# Patient Record
Sex: MALE | Race: BLACK OR AFRICAN AMERICAN | NOT HISPANIC OR LATINO | Employment: UNEMPLOYED | URBAN - NONMETROPOLITAN AREA
[De-identification: names, ages, dates, MRNs, and addresses within clinical notes are randomized per-mention and may not be internally consistent; named-entity substitution may affect disease eponyms.]

---

## 2018-11-05 ENCOUNTER — TRANSCRIBE ORDERS (OUTPATIENT)
Dept: PHYSICAL THERAPY | Facility: HOSPITAL | Age: 33
End: 2018-11-05

## 2018-11-05 DIAGNOSIS — M54.12 RADICULOPATHY, CERVICAL REGION: Primary | ICD-10-CM

## 2019-02-20 ENCOUNTER — TRANSCRIBE ORDERS (OUTPATIENT)
Dept: PHYSICAL THERAPY | Facility: HOSPITAL | Age: 34
End: 2019-02-20

## 2019-02-20 DIAGNOSIS — M54.12 RADICULOPATHY, CERVICAL REGION: Primary | ICD-10-CM

## 2019-03-06 ENCOUNTER — HOSPITAL ENCOUNTER (OUTPATIENT)
Dept: PHYSICAL THERAPY | Facility: HOSPITAL | Age: 34
Setting detail: THERAPIES SERIES
Discharge: HOME | End: 2019-03-06

## 2019-03-06 DIAGNOSIS — M54.12 RADICULOPATHY, CERVICAL REGION: Primary | ICD-10-CM

## 2019-03-06 PROCEDURE — 97110 THERAPEUTIC EXERCISES: CPT | Performed by: PHYSICAL THERAPIST

## 2019-03-06 PROCEDURE — 97161 PT EVAL LOW COMPLEX 20 MIN: CPT | Performed by: PHYSICAL THERAPIST

## 2019-03-06 NOTE — THERAPY EVALUATION
"    Outpatient Physical Therapy Ortho Initial Evaluation  [facility]  Emily Aguila, PT, DPT, CSCS       Patient Name: Mckinley Woods  : 1985  MRN: [id number]  Today's Date: 3/6/2019      Visit Date: 2019    Pt reports 6/10 pain pre treatment, 4/10 pain post treatment  Reports N/A% of improvement.  Attended  visits.  Insurance available: 6 visits  Next MD appt: TBD .  Recertification: 2019.     Past Medical History:   Diagnosis Date   • Arthritis    • Asthma    • Neuropathy         Past Surgical History:   Procedure Laterality Date   • CARPAL TUNNEL RELEASE Bilateral 2017       Visit Dx:     ICD-10-CM ICD-9-CM   1. Radiculopathy, cervical region M54.12 723.4     Number of days off work: N/A    Patient is single.    Medications: Operazole 20mg, Tizanidine 4mg, Hydrochlorothiazide 12.5    Allergies: None    Patient History     Row Name 19 1100             History    Chief Complaint  Pain  -AJ      Type of Pain  Neck pain;Shoulder pain  -AJ      Date Current Problem(s) Began  -- ~2 months ago  -AJ      Brief Description of Current Complaint  patient reports the pain in mainly in his B shoulders. He reprots that his neck really doesn't hurt. he rpeorts he just cut all of his hair off to see if that helps. He no injury that he can recall.  -AJ      Previous treatment for THIS PROBLEM  Medication  -AJ      Patient/Caregiver Goals  Relieve pain;Return to prior level of function  -AJ      Current Tobacco Use  ~2 cig/day  -AJ      Smoking Status  daily smoker  -AJ      Patient's Rating of General Health  Good  -AJ      Hand Dominance  right-handed  -AJ      Occupation/sports/leisure activities  Occupation: lawn/landscaping; Hobbies: motor work, pools  -AJ      What clinical tests have you had for this problem?  X-ray  -AJ      Results of Clinical Tests  \"The didn't really see anything\"  -AJ      History of Previous Related Injuries  None that can recall  -AJ  "        Pain     Pain Location  Neck;Shoulder  -AJ      Pain at Present  6  -AJ      Pain at Best  0  -AJ      Pain at Worst  9  -AJ      Pain Frequency  Intermittent  -AJ      Pain Description  Throbbing;Burning  -AJ      What Performance Factors Make the Current Problem(s) WORSE?  lifting  -AJ      What Performance Factors Make the Current Problem(s) BETTER?  rest  -AJ      Is your sleep disturbed?  Yes  -AJ      Is medication used to assist with sleep?  Yes  -AJ      What position do you sleep in?  Supine  -AJ      Difficulties at work?  Off for off season, but unable if will be able to return  -AJ      Difficulties with ADL's?  None  -AJ      Difficulties with recreational activities?  motor work  -AJ        User Key  (r) = Recorded By, (t) = Taken By, (c) = Cosigned By    Initials Name Provider Type    Emily Morillo, PT Physical Therapist          PT Ortho     Row Name 03/06/19 1100       Subjective Comments    Subjective Comments  Patient wishes ot get the pain to go away.  -AJ       Precautions and Contraindications    Precautions/Limitations  no known precautions/limitations  -AJ       Subjective Pain    Able to rate subjective pain?  yes  -AJ    Pre-Treatment Pain Level  6  -AJ    Post-Treatment Pain Level  4  -AJ       Posture/Observations    Alignment Options  Forward head;Cervical lordosis;Thoracic kyphosis;Rounded shoulders;Scapular elevation  -AJ    Forward Head  Mild;Moderate;Increased  -AJ    Cervical Lordosis  Mild;Increased  -AJ    Thoracic Kyphosis  Mild;Increased  -AJ    Rounded Shoulders  Bilateral:;Mild;Moderate;Increased  -AJ    Scapular Elevation  Bilateral:;Mild;Increased  -AJ    Posture/Observations Comments  No acute distress, poor overall postural awareness.  -AJ       Sensory Screen for Light Touch- Upper Quarter Clearing    C4 (posterior shoulder)  Bilateral:;Intact  -AJ    C5 (lateral upper arm)  Bilateral:;Intact  -AJ    C6 (tip of thumb)  Bilateral:;Diminished  -AJ    C7  (tip of 3rd finger)  Bilateral:;Intact  -AJ    C8 (tip of 5th finger)  Bilateral:;Intact  -AJ    T1 (medial lower arm)  Bilateral:;Intact  -AJ       Cervical/Thoracic Special Tests    Spurlings (Foraminal Compression)  Negative  -AJ    Cervical Compression (Forarminal Compression vs. Facet Pain)  Bilateral:;Negative  -AJ    Cervical Distraction (Foraminal Compression vs. Facet Pain)  Bilateral:;Negative  -AJ    Transverse Ligament (Instability)  Negative  -AJ    Vertebral Artery Test (VBI Sign)  Bilateral:;Negative  -AJ    Phalen's Test (Carpal Tunnel Syndrome)  Bilateral:;Positive  -AJ       Head/Neck/Trunk    Neck Extension AROM  45°  -AJ    Neck Flexion AROM  50°  -AJ    Neck Lt Lateral Flexion AROM  30°  -AJ    Neck Rt Lateral Flexion AROM  27°  -AJ    Neck Lt Rotation AROM  62°  -AJ    Neck Rt Rotation AROM  55°  -AJ       MMT (Manual Muscle Testing)    General MMT Comments  B UE 4/5, poor  effeort, no change in pain  -AJ        Strength Right    # Reps  2  -AJ    Right Rung  2  -AJ    Right  Test 1  30  -AJ    Right  Test 2  26  -AJ     Strength Average Right  28  -AJ        Strength Left    # Reps  2  -AJ    Left Rung  2  -AJ    Left  Test 1  62  -AJ    Left  Test 2  62  -AJ     Strength Average Left  62  -AJ       Sensation    Sensation WNL?  WFL  -AJ    Light Touch  Partial deficits in the RUE;Partial deficits in the LUE  -AJ    Additional Comments  Some mild deficits  -AJ       Upper Extremity Flexibility    SCM  Bilateral:;Mildly limited  -AJ    Upper Trapezius  Bilateral:;Mildly limited;Moderately limited  -AJ    Levator Scapula  Bilateral:;Mildly limited;Moderately limited  -AJ       Pathomechanics    Spine Pathomechanics  Hinges into extension in lower cervical  -AJ       Transfers    Comment (Transfers)  I with all transfers.  -AJ       Gait/Stairs Assessment/Training    Comment (Gait/Stairs)  FWB, non-antalgic gait, normal arm swing with gait.  -AJ       Hand   Strength     Strength Affected Side  Bilateral  -ESTELA      User Key  (r) = Recorded By, (t) = Taken By, (c) = Cosigned By    Initials Name Provider Type    Emily Morillo, SHAHIDA Physical Therapist            Therapy Education  Given: HEP, Symptoms/condition management, Pain management, Posture/body mechanics, Mobility training  Program: New  How Provided: Verbal, Demonstration, Written  Provided to: Patient  Level of Understanding: Verbalized, Demonstrated     PT OP Goals     Row Name 03/06/19 1100          PT Short Term Goals    STG Date to Achieve  03/27/19  -     STG 1  I with HEP and have additions/changes by next recertitfication.  -     STG 2  AROM Cervical extension >= 55°.  -     STG 3  AROm B cervical SB >= 35°.  -     STG 4  AROm B cervical ROT >= 65°.  -     STG 5  B UE 4+/5 or greater.  -        Long Term Goals    LTG Date to Achieve  04/05/19  -     LTG 1  AROm B cervical ROT >= 70°.  -     LTG 2  AROm B cervical SB >= 40°.  -     LTG 3  b UE 5/5.  -     LTG 4  Patient to be more aware of posture and posture correction technique.  -     LTG 5  R  >= 50#.  -     LTG 6  I with final HEP.  -     LTG 7  D/C with a final HEP and free 30 day fitness formula memembership.  -        Time Calculation    PT Goal Re-Cert Due Date  03/27/19  -       User Key  (r) = Recorded By, (t) = Taken By, (c) = Cosigned By    Initials Name Provider Type    Emily Morillo, SHAHIDA Physical Therapist         Barriers to Rehab: Include significant or possible arthritic/degenerative changes that have occurred within the spine, Possible monetary/secondary gain.    Safety Issues: None noted.      PT Assessment/Plan     Row Name 03/06/19 1100          PT Assessment    Functional Limitations  Limitation in home management;Limitations in community activities;Performance in leisure activities;Performance in self-care ADL;Performance in work activities  -ESTELA     Impairments  Impaired  flexibility;Impaired muscle endurance;Impaired muscle length;Impaired muscle power;Impaired postural alignment;Joint mobility;Locomotion;Muscle strength;Pain;Posture;Poor body mechanics;Range of motion;Sensation  -     Assessment Comments  Patient did well with all ther ex and given written copy of HEP exercises.  -AJ     Rehab Potential  Fair  -AJ     Patient/caregiver participated in establishment of treatment plan and goals  Yes  -AJ     Patient would benefit from skilled therapy intervention  Yes  -AJ        PT Plan    PT Frequency  2x/week  -AJ     Predicted Duration of Therapy Intervention (Therapy Eval)  3-4 weeks, 6-8 visits  -AJ     Planned CPT's?  PT EVAL MOD COMPLELITY: 24031;PT RE-EVAL: 52560;PT THER PROC EA 15 MIN: 88584;PT THER ACT EA 15 MIN: 05267;PT MANUAL THERAPY EA 15 MIN: 47842;PT ELECTRICAL STIM UNATTEND: ;PT THER SUPP EA 15 MIN  -AJ     Physical Therapy Interventions (Optional Details)  gross motor skills;home exercise program;joint mobilization;manual therapy techniques;modalities;patient/family education;postural re-education;ROM (Range of Motion);strengthening;stretching  -     PT Plan Comments  Add reverse swims. Progrress overall ROM, strength, posture, scap stab, neural extensibility.  -       User Key  (r) = Recorded By, (t) = Taken By, (c) = Cosigned By    Initials Name Provider Type    Emily Morillo, PT Physical Therapist       Other therapeutic activities and/or exercises will be prescribed depending on the patients progress or lack there of.      Exercises     Row Name 03/06/19 1100             Subjective Comments    Subjective Comments  Patient wishes ot get the pain to go away.  -AJ         Subjective Pain    Able to rate subjective pain?  yes  -AJ      Pre-Treatment Pain Level  6  -AJ      Post-Treatment Pain Level  4  -AJ         Exercise 1    Exercise Name 1  pro II UE F/R  -AJ      Time 1  10 minutes  -      Additional Comments  L 3.0  -AJ          Exercise 2    Exercise Name 2  B UT S  -AJ      Reps 2  2  -AJ      Time 2  30 seconds  -AJ         Exercise 3    Exercise Name 3  B LS S  -AJ      Reps 3  2  -AJ      Time 3  30 seconds  -AJ         Exercise 4    Exercise Name 4  B SCM S  -AJ      Reps 4  2  -AJ      Time 4  30 seconds  -AJ         Exercise 5    Exercise Name 5  Posterior shoulder rolls between S  -AJ      Reps 5  10 each  -AJ         Exercise 6    Exercise Name 6  Swims  -AJ      Reps 6  10  -AJ        User Key  (r) = Recorded By, (t) = Taken By, (c) = Cosigned By    Initials Name Provider Type    Emily Morillo, PT Physical Therapist                        Outcome Measure Options: Neck Disability Index (NDI)  Neck Disability Index  Section 1 - Pain Intensity: The pain is moderate at the moment.  Section 2 - Personal Care: I need some help but manage most of my personal care.  Section 3 - Lifting: Pain prevents me from lifting heavy weights, but I can manage light weights if they are conveniently positioned.  Section 4 - Work: I can hardly do any work at all.  Section 5 - Headaches: I have no headaches at all.  Section 6 - Concentration: I have a fair degree of difficulty concentrating.  Section 7 - Sleeping: My sleep is greatly disturbed for up to 3-5 hours.  Section 8 - Driving: I can't drive as long as I want because of moderate neck pain.  Section 9 - Reading: I can't read as much as I want because of severe neck pain.  Section 10 - Recreation: I have neck pain with most recreational activities.  Neck Disability Index Score: 28  Neck Disability Index Comments: 56%      Time Calculation:   Start Time: 1106  Stop Time: 1152  Time Calculation (min): 46 min  Total Timed Code Minutes- PT: 23 minute(s)     Therapy Charges for Today     Code Description Service Date Service Provider Modifiers Qty    82689007019 HC PT EVAL LOW COMPLEXITY 2 3/6/2019 Emily Aguila, PT GP 1    21201843537 HC PT THER PROC EA 15 MIN 3/6/2019 [place]  Emily Hassan, PT GP 2    [id number]  PT THER SUPP EA 15 MIN 3/6/2019 Emily Aguila, PT GP 1          PT G-Codes  Outcome Measure Options: Neck Disability Index (NDI)  Neck Disability Index Score: 28         Emily Aguila PT, DPT, CSCS  3/6/2019

## 2019-03-11 ENCOUNTER — APPOINTMENT (OUTPATIENT)
Dept: PHYSICAL THERAPY | Facility: HOSPITAL | Age: 34
End: 2019-03-11

## 2019-03-12 ENCOUNTER — TELEPHONE (OUTPATIENT)
Dept: PHYSICAL THERAPY | Facility: HOSPITAL | Age: 34
End: 2019-03-12

## 2019-03-12 ENCOUNTER — APPOINTMENT (OUTPATIENT)
Dept: PHYSICAL THERAPY | Facility: HOSPITAL | Age: 34
End: 2019-03-12

## 2019-03-12 NOTE — TELEPHONE ENCOUNTER
Patient states that he didn't have a ride this morning because his brother didn't get off work on time. Agreeable to coming in at 14:30 today to recapture.

## 2019-03-15 ENCOUNTER — HOSPITAL ENCOUNTER (OUTPATIENT)
Dept: PHYSICAL THERAPY | Facility: HOSPITAL | Age: 34
Setting detail: THERAPIES SERIES
Discharge: HOME | End: 2019-03-15

## 2019-03-15 DIAGNOSIS — M54.12 RADICULOPATHY, CERVICAL REGION: Primary | ICD-10-CM

## 2019-03-15 PROCEDURE — 97110 THERAPEUTIC EXERCISES: CPT

## 2019-03-15 PROCEDURE — G0283 ELEC STIM OTHER THAN WOUND: HCPCS

## 2019-03-15 NOTE — THERAPY TREATMENT NOTE
Outpatient Physical Therapy Ortho Treatment Note  [facility]     Patient Name: Mckinley Woods  : 1985  MRN: [id number]  Today's Date: 3/15/2019      Visit Date: 03/15/2019  Reports N/A% of improvement.  Attended 2/2 visits.  Insurance available: 6 visits  Next MD appt: TBD .  Recertification: 2019.      Visit Dx:    ICD-10-CM ICD-9-CM   1. Radiculopathy, cervical region M54.12 723.4       There is no problem list on file for this patient.       Past Medical History:   Diagnosis Date   • Arthritis    • Asthma    • Neuropathy         Past Surgical History:   Procedure Laterality Date   • CARPAL TUNNEL RELEASE Bilateral 2017       PT Ortho     Row Name 03/15/19 0800       Precautions and Contraindications    Precautions/Limitations  no known precautions/limitations  -      User Key  (r) = Recorded By, (t) = Taken By, (c) = Cosigned By    Initials Name Provider Type    Carlitos Carrasco PTA Physical Therapy Assistant                      PT Assessment/Plan     Row Name 03/15/19 0900          PT Assessment    Functional Limitations  Limitation in home management;Limitations in community activities;Performance in leisure activities;Performance in self-care ADL;Performance in work activities  -     Impairments  Impaired flexibility;Impaired muscle endurance;Impaired muscle length;Impaired muscle power;Impaired postural alignment;Joint mobility;Locomotion;Muscle strength;Pain;Posture;Poor body mechanics;Range of motion;Sensation  -     Assessment Comments  Good erin of today's visit. Some cont reactivity at R UT area. He could possibly benefit from manual to R UT area.   -     Rehab Potential  Fair  -     Patient/caregiver participated in establishment of treatment plan and goals  Yes  -     Patient would benefit from skilled therapy intervention  Yes  -        PT Plan    PT Frequency  2x/week  -     Predicted Duration of Therapy Intervention (Therapy Eval)  3-4  "weeks  -JW     PT Plan Comments  Add STM to R UT.   -JW       User Key  (r) = Recorded By, (t) = Taken By, (c) = Cosigned By    Initials Name Provider Type    Carlitos Carrasco PTA Physical Therapy Assistant          Modalities     Row Name 03/15/19 0800             Ice    Ice Applied  Yes  -JW      Location  R UT  -JW      Rx Minutes  15 mins  -JW      Ice S/P Rx  Yes  -JW         ELECTRICAL STIMULATION    Attended/Unattended  Unattended  -JW      Stimulation Type  IFC  -JW      Location/Electrode Placement/Other  R UT  -JW       PT E-Stim Unattended (Manual) Minutes  15  -JW        User Key  (r) = Recorded By, (t) = Taken By, (c) = Cosigned By    Initials Name Provider Type    Carlitos Carrasco PTA Physical Therapy Assistant          Exercises     Row Name 03/15/19 0800             Subjective Comments    Subjective Comments  Pt late for visit. He reports R UT area pain today. He reports compliance with HEP.   -JW         Subjective Pain    Able to rate subjective pain?  yes  -JW      Pre-Treatment Pain Level  5  -JW      Post-Treatment Pain Level  -- decreased  -JW         Exercise 1    Exercise Name 1  PRO2, UE's  -JW      Time 1  5'  -JW      Additional Comments  L3  -JW         Exercise 2    Exercise Name 2  R UT S  -JW      Sets 2  2  -JW      Time 2  30\"  -JW         Exercise 3    Exercise Name 3  R LS S  -JW      Sets 3  2  -JW      Time 3  30\"  -JW         Exercise 4    Exercise Name 4  CROM: lat flex, rot  -JW      Reps 4  10x ea  -JW         Exercise 5    Exercise Name 5  Post shld rolls  -JW      Reps 5  20  -JW         Exercise 6    Exercise Name 6  Supine cerv retractions  -JW      Sets 6  2  -JW      Reps 6  10  -JW         Exercise 7    Exercise Name 7  Seated thoracic ext  -JW      Sets 7  2  -JW      Reps 7  10  -JW      Time 7  3\"  -JW      Additional Comments  1/2 foam roller in chair  -JW         Exercise 8    Exercise Name 8  Rows  -JW      Reps 8  20  -JW      Time 8  3\"  -JW      " Additional Comments  green tb  -         Exercise 9    Exercise Name 9  Prone over PB B horz abd  -      Sets 9  2  -      Reps 9  10  -        User Key  (r) = Recorded By, (t) = Taken By, (c) = Cosigned By    Initials Name Provider Type    Carlitos Carrasco PTA Physical Therapy Assistant                         PT OP Goals     Row Name 03/15/19 0800          PT Short Term Goals    STG Date to Achieve  03/27/19  -     STG 1  I with HEP and have additions/changes by next recertitfication.  -     STG 2  AROM Cervical extension >= 55°.  -     STG 3  AROm B cervical SB >= 35°.  -     STG 4  AROm B cervical ROT >= 65°.  -     STG 5  B UE 4+/5 or greater.  -        Long Term Goals    LTG Date to Achieve  04/05/19  -     LTG 1  AROm B cervical ROT >= 70°.  -     LTG 2  AROm B cervical SB >= 40°.  -     LTG 3  b UE 5/5.  -     LTG 4  Patient to be more aware of posture and posture correction technique.  -     LTG 5  R  >= 50#.  -     LTG 6  I with final HEP.  -     LTG 7  D/C with a final HEP and free 30 day fitness [facility].  -       User Chung  (r) = Recorded By, (t) = Taken By, (c) = Cosigned By    Initials Name Provider Type    Carlitos Carrasco PTA Physical Therapy Assistant                         Time Calculation:   Start Time: 0815  Stop Time: 0900  Time Calculation (min): 45 min  Total Timed Code Minutes- PT: 45 minute(s)  Therapy Suggested Charges     Code   Minutes Charges    11931 (CPT®) Hc Pt Neuromusc Re Education Ea 15 Min      85314 (CPT®) Hc Pt Ther Proc Ea 15 Min      29718 (CPT®) Hc Gait Training Ea 15 Min      26641 (CPT®) Hc Pt Therapeutic Act Ea 15 Min      84641 (CPT®) Hc Pt Manual Therapy Ea 15 Min      87699 (CPT®) Hc Pt Ther Massage- Per 15 Min      54262 (CPT®) Hc Pt Iontophoresis Ea 15 Min      56236 (CPT®) Hc Pt Elec Stim Ea-Per 15 Min      38838 (CPT®) Hc Pt Ultrasound Ea 15 Min      27396 (CPT®) Hc Pt Self Care/Mgmt/Train Ea 15 Min       85071 (CPT®) Hc Pt Prosthetic (S) Train Initial Encounter, Each 15 Min      14571 (CPT®) Hc Orthotic(S) Mgmt/Train Initial Encounter, Each 15min      34590 (CPT®) Hc Pt Aquatic Therapy Ea 15 Min      77779 (CPT®) Hc Pt Orthotic(S)/Prosthetic(S) Encounter, Each 15 Min       (CPT®) Hc Pt Electrical Stim Unattended 15 1    Total  15 1        Therapy Charges for Today     Code Description Service Date Service Provider Modifiers Qty    70456815290 HC PT ELECTRICAL STIM UNATTENDED 3/15/2019 Carlitos Mix, PTA  1    84506457737 HC PT THER PROC EA 15 MIN 3/15/2019 Carlitos Mix, PTA GP 2                    Carlitos Mix, YOKO  3/15/2019

## 2019-03-21 ENCOUNTER — TELEPHONE (OUTPATIENT)
Dept: PHYSICAL THERAPY | Facility: HOSPITAL | Age: 34
End: 2019-03-21

## 2019-04-01 ENCOUNTER — APPOINTMENT (OUTPATIENT)
Dept: PHYSICAL THERAPY | Facility: HOSPITAL | Age: 34
End: 2019-04-01

## 2019-04-04 ENCOUNTER — APPOINTMENT (OUTPATIENT)
Dept: PHYSICAL THERAPY | Facility: HOSPITAL | Age: 34
End: 2019-04-04

## 2019-04-18 ENCOUNTER — DOCUMENTATION (OUTPATIENT)
Dept: PHYSICAL THERAPY | Facility: HOSPITAL | Age: 34
End: 2019-04-18

## 2019-04-18 DIAGNOSIS — M54.12 RADICULOPATHY, CERVICAL REGION: Primary | ICD-10-CM

## 2020-03-02 ENCOUNTER — OFFICE VISIT (OUTPATIENT)
Dept: CARDIOLOGY | Facility: CLINIC | Age: 35
End: 2020-03-02

## 2020-03-02 VITALS
BODY MASS INDEX: 27.5 KG/M2 | SYSTOLIC BLOOD PRESSURE: 110 MMHG | HEIGHT: 67 IN | DIASTOLIC BLOOD PRESSURE: 78 MMHG | WEIGHT: 175.2 LBS | OXYGEN SATURATION: 100 % | HEART RATE: 63 BPM

## 2020-03-02 DIAGNOSIS — R07.9 CHEST PAIN, UNSPECIFIED TYPE: Primary | ICD-10-CM

## 2020-03-02 DIAGNOSIS — Z99.89 AMBULATES WITH CANE: ICD-10-CM

## 2020-03-02 DIAGNOSIS — R07.89 CHEST PAIN, ATYPICAL: Primary | ICD-10-CM

## 2020-03-02 DIAGNOSIS — I10 ESSENTIAL HYPERTENSION: ICD-10-CM

## 2020-03-02 DIAGNOSIS — F17.200 SMOKER: ICD-10-CM

## 2020-03-02 DIAGNOSIS — R00.2 PALPITATIONS: ICD-10-CM

## 2020-03-02 DIAGNOSIS — R06.09 DYSPNEA ON EXERTION: ICD-10-CM

## 2020-03-02 PROCEDURE — 99204 OFFICE O/P NEW MOD 45 MIN: CPT | Performed by: INTERNAL MEDICINE

## 2020-03-02 PROCEDURE — 93000 ELECTROCARDIOGRAM COMPLETE: CPT | Performed by: INTERNAL MEDICINE

## 2020-03-02 RX ORDER — AMLODIPINE BESYLATE 5 MG/1
TABLET ORAL DAILY
COMMUNITY

## 2020-03-02 RX ORDER — TRAMADOL HYDROCHLORIDE 50 MG/1
TABLET ORAL EVERY 6 HOURS
COMMUNITY
End: 2021-04-22

## 2020-03-02 RX ORDER — FLUTICASONE PROPIONATE 50 MCG
SPRAY, SUSPENSION (ML) NASAL DAILY
COMMUNITY

## 2020-03-02 NOTE — PATIENT INSTRUCTIONS
Dr. Chin has recommended a pharmacologic (dobutamine) stress test with echocardiography.    What is a Dobutamine Stress Echo Test?     Why do I need a stress test?     This test helps your physician determine how your heart functions when it is made to work harder by injecting dobutamine. Dobutamine is a drug that has an effect on the heart similar to exercise. This test is done on patients that are unable to exercise adequately or have severe lung disease. An echocardiogram, the ultrasound study of the heart, evaluates the heart’s size, how strongly it pumps blood, and how well the valves are working.     The dobutamine stress echo test is useful to determine:   • If there is a decreased supply of blood and oxygen to the heart at rest as well as with exercise   • If there is reduced movement of the heart muscle.   • Overall level of cardiovascular conditioning   • How quickly the heart recovers after exercise   • How hard the heart can work before symptoms develop   • Estimate the risk of surgery that can cause cardiac complications     What happens during the test?   • An echo technician will do a resting scan of your heart while you are lying down on an exam table. You will lie on your back and on your left side  . • A stress  will prep ten small areas on your chest and place electrodes (small, flat, sticky patches) on these areas. The electrodes are attached to an EKG monitor that charts your heart’s electrical activity during the test.   • You will be lying down on an exam table while the technician performs a resting EKG and blood pressure.   • A nurse will place an IV into a vein in your arm or hand. Next, your heart will be “stressed” by injecting a medication called dobutamine into the IV. This medication will increase your heart rate.   • Please tell the technician immediately if you have chest pain, shortness of breath, or any other unusual symptoms at any time.   • The stress lab staff  will watch for any changes on the EKG monitor that suggest the test should be stopped. The testing area is supervised by a physician.   • The echo technician will take images of your heart every three minutes during the test. • At the peak effects of dobutamine, a second echocardiogram will be taken to visualize the heart’s motion with exercise.   • Your heart rate, blood pressure, and EKG will continue to be monitored until the levels are returning to normal. One more echo scan will be done as your heart rate returns to normal    The risks     This test is very safe and there are usually no problems. There is a small risk of an abnormal heartbeat, chest pain or nausea and, if this happens, the appropriate action will be taken. On very rare occasions (approximately 1 in 2000) the test is associated with life-threatening events. If you have any questions about the risks associated with the procedure, your medical team will be able to discuss them with you at the appointment.      Instruction checklist for the dobutamine stress echo test:     . • Bring a list of medications you take with you to the test. Include the name and dosage amounts of each medicine. This information can be found on the prescription or bottle label.     You may take any of your regular morning medications unless otherwise instructed.       § If you have asthma, please bring your inhaler medication with you.     § If you have diabetes, ask your physician how to adjust your medications the day of your test.     • Please refrain from any strenuous exercise or activities the day before your test, or the day of the test     • Do Not eat or drink anything except for small amounts of water for 4 hours prior to your testing time.      • Do Not use lotion or powders on your chest the day of the test.     • Wear loose fitting, comfortable clothing. Pants or shorts and short sleeve shirts are preferred. (No long sleeves.) Do not wear one-piece  undergarments or body suits.      • The Dobutamine Stress Echo takes about one hour to complete including check-in time and actual test time.      • If you need to CANCEL OR CHANGE your appointment, please call [phone].     • If you have any QUESTIONS about the test, Please call [phone] and ask to speak to Dr. Giuseppe Baker's Medical Assistant. Please leave a message if prompted to do so. We will return your call as soon as possible.     • We request a 24 hour notice for changes or cancellations.    You MUST complete the DSE within 30 calendar days of being ordered by Dr. Chin.     You MUST arrive for your DSE appointment 10 minutes BEFORE the scheduled time, or your test will be rescheduled.    Results:    Dr. Chin will be physically present during your dobutamine stress echo.  You will be given the results of your test in real time.    Steps to Quit Smoking    Smoking tobacco can be harmful to your health and can affect almost every organ in your body. Smoking puts you, and those around you, at risk for developing many serious chronic diseases. Quitting smoking is difficult, but it is one of the best things that you can do for your health. It is never too late to quit.  What are the benefits of quitting smoking?  When you quit smoking, you lower your risk of developing serious diseases and conditions, such as:  · Lung cancer or lung disease, such as COPD.  · Heart disease.  · Stroke.  · Heart attack.  · Infertility.  · Osteoporosis and bone fractures.  Additionally, symptoms such as coughing, wheezing, and shortness of breath may get better when you quit. You may also find that you get sick less often because your body is stronger at fighting off colds and infections. If you are pregnant, quitting smoking can help to reduce your chances of having a baby of low birth weight.  How do I get ready to quit?  When you decide to quit smoking, create a plan to make sure that you are successful.  Before you quit:  · Pick a date to quit. Set a date within the next two weeks to give you time to prepare.  · Write down the reasons why you are quitting. Keep this list in places where you will see it often, such as on your bathroom mirror or in your car or wallet.  · Identify the people, places, things, and activities that make you want to smoke (triggers) and avoid them. Make sure to take these actions:  ? Throw away all cigarettes at home, at work, and in your car.  ? Throw away smoking accessories, such as ashtrays and lighters.  ? Clean your car and make sure to empty the ashtray.  ? Clean your home, including curtains and carpets.  · Tell your family, friends, and coworkers that you are quitting. Support from your loved ones can make quitting easier.  · Talk with your health care provider about your options for quitting smoking.  · Find out what treatment options are covered by your health insurance.  What strategies can I use to quit smoking?  Talk with your healthcare provider about different strategies to quit smoking. Some strategies include:  · Quitting smoking altogether instead of gradually lessening how much you smoke over a period of time. Research shows that quitting “cold turkey” is more successful than gradually quitting.  · Attending in-person counseling to help you build problem-solving skills. You are more likely to have success in quitting if you attend several counseling sessions. Even short sessions of 10 minutes can be effective.  · Finding resources and support systems that can help you to quit smoking and remain smoke-free after you quit. These resources are most helpful when you use them often. They can include:  ? Online chats with a counselor.  ? Telephone quitlines.  ? Printed self-help materials.  ? Support groups or group counseling.  ? Text messaging programs.  ? Mobile phone applications.  · Taking medicines to help you quit smoking. (If you are pregnant or breastfeeding, talk  with your health care provider first.) Some medicines contain nicotine and some do not. Both types of medicines help with cravings, but the medicines that include nicotine help to relieve withdrawal symptoms. Your health care provider may recommend:  ? Nicotine patches, gum, or lozenges.  ? Nicotine inhalers or sprays.  ? Non-nicotine medicine that is taken by mouth.  Talk with your health care provider about combining strategies, such as taking medicines while you are also receiving in-person counseling. Using these two strategies together makes you more likely to succeed in quitting than if you used either strategy on its own.  If you are pregnant or breastfeeding, talk with your health care provider about finding counseling or other support strategies to quit smoking. Do not take medicine to help you quit smoking unless told to do so by your health care provider.  What things can I do to make it easier to quit?  Quitting smoking might feel overwhelming at first, but there is a lot that you can do to make it easier. Take these important actions:  · Reach out to your family and friends and ask that they support and encourage you during this time. Call telephone quitlines, reach out to support groups, or work with a counselor for support.  · Ask people who smoke to avoid smoking around you.  · Avoid places that trigger you to smoke, such as bars, parties, or smoke-break areas at work.  · Spend time around people who do not smoke.  · Lessen stress in your life, because stress can be a smoking trigger for some people. To lessen stress, try:  ? Exercising regularly.  ? Deep-breathing exercises.  ? Yoga.  ? Meditating.  ? Performing a body scan. This involves closing your eyes, scanning your body from head to toe, and noticing which parts of your body are particularly tense. Purposefully relax the muscles in those areas.  · Download or purchase mobile phone or tablet apps (applications) that can help you stick to your  quit plan by providing reminders, tips, and encouragement. There are many free apps, such as QuitGuide from the CDC (Centers for Disease Control and Prevention). You can find other support for quitting smoking (smoking cessation) through smokefree.gov and other websites.  How will I feel when I quit smoking?  Within the first 24 hours of quitting smoking, you may start to feel some withdrawal symptoms. These symptoms are usually most noticeable 2-3 days after quitting, but they usually do not last beyond 2-3 weeks. Changes or symptoms that you might experience include:  · Mood swings.  · Restlessness, anxiety, or irritation.  · Difficulty concentrating.  · Dizziness.  · Strong cravings for sugary foods in addition to nicotine.  · Mild weight gain.  · Constipation.  · Nausea.  · Coughing or a sore throat.  · Changes in how your medicines work in your body.  · A depressed mood.  · Difficulty sleeping (insomnia).  After the first 2-3 weeks of quitting, you may start to notice more positive results, such as:  · Improved sense of smell and taste.  · Decreased coughing and sore throat.  · Slower heart rate.  · Lower blood pressure.  · Clearer skin.  · The ability to breathe more easily.  · Fewer sick days.  Quitting smoking is very challenging for most people. Do not get discouraged if you are not successful the first time. Some people need to make many attempts to quit before they achieve long-term success. Do your best to stick to your quit plan, and talk with your health care provider if you have any questions or concerns.  This information is not intended to replace advice given to you by your health care provider. Make sure you discuss any questions you have with your health care provider.  Document Released: 12/12/2002 Document Revised: 07/24/2018 Document Reviewed: 05/03/2016  [employer] Interactive Patient Education © 2020 [employer]
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
## 2020-03-17 DIAGNOSIS — R00.2 PALPITATIONS: Primary | ICD-10-CM

## 2020-05-15 ENCOUNTER — TELEPHONE (OUTPATIENT)
Dept: CARDIOLOGY | Facility: CLINIC | Age: 35
End: 2020-05-15

## 2020-05-15 NOTE — TELEPHONE ENCOUNTER
Patient was scheduled for a telephone visit today for test results. Testing has not been completed. The patient was contacted to reschedule his testing and appt. Stress echo was scheduled for may 19th at noon. Instructions were also provided. appt staff will contact him with the dates and times of other testing as well as his f/u with dr. Chin.

## 2020-05-19 ENCOUNTER — HOSPITAL ENCOUNTER (OUTPATIENT)
Dept: CARDIOLOGY | Facility: HOSPITAL | Age: 35
Discharge: HOME | End: 2020-05-19
Admitting: INTERNAL MEDICINE

## 2020-05-19 VITALS — BODY MASS INDEX: 26.78 KG/M2 | WEIGHT: 171 LBS

## 2020-05-19 PROCEDURE — 25010000002 ATROPINE PER 0.01 MG: Performed by: NURSE PRACTITIONER

## 2020-05-19 PROCEDURE — 25010000002 DOBUTAMINE: Performed by: INTERNAL MEDICINE

## 2020-05-19 PROCEDURE — 93350 STRESS TTE ONLY: CPT

## 2020-05-19 PROCEDURE — 93325 DOPPLER ECHO COLOR FLOW MAPG: CPT

## 2020-05-19 PROCEDURE — 93017 CV STRESS TEST TRACING ONLY: CPT

## 2020-05-19 PROCEDURE — 93320 DOPPLER ECHO COMPLETE: CPT

## 2020-05-19 PROCEDURE — 93351 STRESS TTE COMPLETE: CPT | Performed by: INTERNAL MEDICINE

## 2020-05-19 RX ORDER — ATROPINE SULFATE 1 MG/ML
0.7 INJECTION, SOLUTION INTRAMUSCULAR; INTRAVENOUS; SUBCUTANEOUS ONCE
Status: COMPLETED | OUTPATIENT
Start: 2020-05-19 | End: 2020-05-19

## 2020-05-19 RX ADMIN — DOBUTAMINE 10 MCG/KG/MIN: 12.5 INJECTION, SOLUTION, CONCENTRATE INTRAVENOUS at 12:29

## 2020-05-19 RX ADMIN — ATROPINE SULFATE 0.7 MG: 1 INJECTION, SOLUTION INTRAMUSCULAR; INTRAVENOUS; SUBCUTANEOUS at 12:28

## 2020-05-20 LAB
BH CV ECHO MEAS - BSA(HAYCOCK): 1.9 M^2
BH CV ECHO MEAS - BSA: 1.9 M^2
BH CV ECHO MEAS - BZI_BMI: 26.8 KILOGRAMS/M^2
BH CV ECHO MEAS - BZI_METRIC_HEIGHT: 170.2 CM
BH CV ECHO MEAS - BZI_METRIC_WEIGHT: 77.6 KG
BH CV STRESS BP STAGE 1: NORMAL
BH CV STRESS BP STAGE 2: NORMAL
BH CV STRESS BP STAGE 3: NORMAL
BH CV STRESS BP STAGE 4: NORMAL
BH CV STRESS DOSE DOBUTAMINE STAGE 1: 10
BH CV STRESS DOSE DOBUTAMINE STAGE 2: 20
BH CV STRESS DOSE DOBUTAMINE STAGE 3: 30
BH CV STRESS DOSE DOBUTAMINE STAGE 4: 40
BH CV STRESS DURATION MIN STAGE 1: 3
BH CV STRESS DURATION MIN STAGE 2: 3
BH CV STRESS DURATION MIN STAGE 3: 3
BH CV STRESS DURATION MIN STAGE 4: 3
BH CV STRESS DURATION SEC STAGE 1: 0
BH CV STRESS DURATION SEC STAGE 2: 0
BH CV STRESS DURATION SEC STAGE 3: 0
BH CV STRESS DURATION SEC STAGE 4: 1
BH CV STRESS ECHO POST STRESS EJECTION FRACTION EF: 70 %
BH CV STRESS HR STAGE 1: 55
BH CV STRESS HR STAGE 2: 81
BH CV STRESS HR STAGE 3: 99
BH CV STRESS HR STAGE 4: 160
BH CV STRESS PROTOCOL 1: NORMAL
BH CV STRESS RECOVERY BP: NORMAL MMHG
BH CV STRESS RECOVERY HR: 97 BPM
BH CV STRESS STAGE 1: 1
BH CV STRESS STAGE 2: 2
BH CV STRESS STAGE 3: 3
BH CV STRESS STAGE 4: 4
MAXIMAL PREDICTED HEART RATE: 184 BPM
PERCENT MAX PREDICTED HR: 89.13 %
STRESS BASELINE BP: NORMAL MMHG
STRESS BASELINE HR: 58 BPM
STRESS PERCENT HR: 105 %
STRESS POST ESTIMATED WORKLOAD: 1 METS
STRESS POST PEAK BP: NORMAL MMHG
STRESS POST PEAK HR: 164 BPM
STRESS TARGET HR: 156 BPM

## 2020-12-07 ENCOUNTER — OFFICE VISIT (OUTPATIENT)
Dept: CARDIOLOGY | Facility: CLINIC | Age: 35
End: 2020-12-07

## 2020-12-07 DIAGNOSIS — R06.09 DYSPNEA ON EXERTION: ICD-10-CM

## 2020-12-07 DIAGNOSIS — R00.2 PALPITATIONS: ICD-10-CM

## 2020-12-07 DIAGNOSIS — R07.89 CHEST PAIN, ATYPICAL: Primary | ICD-10-CM

## 2020-12-07 PROCEDURE — 99214 OFFICE O/P EST MOD 30 MIN: CPT | Performed by: INTERNAL MEDICINE

## 2020-12-07 NOTE — PROGRESS NOTES
Pulmonary Arterial Hypertension & Heart Failure   Iain Chin M.D., Ph.D., [facility]              Non-Face-To-Face Scheduled Telephone Service    Mckinley Woods is a 35 y.o. male who has requested telephone service in lieu of a follow-up appointment for results and advice.  This patient is an established patient.  There is no face-to-face service planned within the next 24 hours.  There also has been no E/M in the past 7 days.    You have chosen to receive care through a telephone visit. Do you consent to use a telephone visit for your medical care today? Yes     This phone visit is conducted due to COIVD. Limitations were explained, including inability to perform a physical examination and inability to perform vital signs. Patient was agreeable to proceeding with these limitations, as I personally explained them.       Mckinley Woods is a 35 y.o. male who is followed in my clinic typically.  He was seen for dyspnea, palpitations and CPS. He was a no show to testing. He was then contacted and was agreeable to complete a SE. TTE and holter has not been performed. He has had no further CPS. He continues with exertional dyspnea. He continues with increased cardiac awareness and palpitations.  Review of Systems   Cardiovascular: Positive for dyspnea on exertion, irregular heartbeat and palpitations. Negative for chest pain, claudication and cyanosis.   Respiratory: Positive for shortness of breath. Negative for cough, hemoptysis, sleep disturbances due to breathing, snoring, sputum production and wheezing.      family history includes Asthma in his mother; COPD in his mother; Heart disease in his mother; Hyperlipidemia in his mother; Hypertension in his mother; Rheum arthritis in his mother; Sleep apnea in his sister.   reports that he has been smoking cigarettes. He has smoked for the past 5.00 years. He has never used smokeless tobacco. He reports current alcohol use. He reports current drug  use. Drug: Marijuana.  Allergies   Allergen Reactions   • Other Swelling     Cherries / throat swells   • Flexeril [Cyclobenzaprine] Cough     Coughing up blood       Current Outpatient Medications:   •  amLODIPine (NORVASC) 5 MG tablet, Daily., Disp: , Rfl:   •  fluticasone (FLONASE) 50 MCG/ACT nasal spray, Daily., Disp: , Rfl:   •  omeprazole (priLOSEC) 40 MG capsule, Take 40 mg by mouth Daily., Disp: , Rfl:   •  oxyCODONE-Acetaminophen (PERCOCET PO), Take  by mouth., Disp: , Rfl:   •  traMADol (ULTRAM) 50 MG tablet, Every 6 (Six) Hours., Disp: , Rfl:       DATA REVIEWED:     TTE/COOKIE:  Results for orders placed in visit on 03/02/20   Adult Stress Echo W/ Cont or Stress Agent if Necessary Per Protocol    Narrative · Resting left ventricular systolic function is normal at 56-60%.   Concentric hypertrophy is present.  · Trace pericardial effusion is noted.  · Target heart rate achieved with dobutamine and atropine. Stress ECG   without evidence of ischemia.  · Stress LVEF is hyperdynamic at greater than 70%.  · Normal stress echo with no significant echocardiographic evidence for   myocardial ischemia.          --------------------------------------------------------------------------------------------------  LABS:       No results found for: GLUCOSE, BUN, CREATININE, EGFRIFNONA, EGFRIFAFRI, BCR, K, CO2, CALCIUM, PROTENTOTREF, ALBUMIN, LABIL2, BILIRUBIN, AST, ALT  No results found for: WBC, HGB, HCT, MCV, PLT  No results found for: CHOL, CHLPL, TRIG, HDL, LDL, LDLDIRECT  No results found for: TSH, O8HJTRK, Y1TKGTR, THYROIDAB  No results found for: CKTOTAL, CKMB, CKMBINDEX, TROPONINI, TROPONINT  No results found for: HGBA1C  No results found for: DDIMER  No results found for: ALT  No results found for: HGBA1C  No results found for: GLUF, MICROALBUR, CREATININE  No results found for: IRON, TIBC, FERRITIN  No results found for: INR, PROTIME    Assessment/Plan     1. Chest pain, atypical. Low-risk SE. Results reviewed.  No further complaints. Will mail low-risk stress test explanation.     2. Dyspnea on exertion and palpitations, TTE, 6MWT, ZIO and PFTs were recommended. He is asking for one day testing for all of these. Will get the  to arrange.     Return in about 3 months (around 3/7/2021).         Time: 21 minutes including medical review, telephone, creating a note and ordering tests/follow-ups.     Iain Chin MD PhD

## 2020-12-07 NOTE — PATIENT INSTRUCTIONS
"LOW-RISK STRESS TEST  RESULTS  Iain Chin M.D., Ph.D., [facility]      Mckinley Birdlisa Woods:    Because of the symptoms you are having you were sent for a stress test.  As we discussed today in your office appointment, you have received what is known as a \"low-risk\" stress test result.  Often times, people want to refer to this as a \"normal\" stress test.  However, stress test really are not normal.  They can only classify your risk of having coronary artery disease.  You have received the best case scenario.  This document is my attempt to explain those results to you further.    Stress testing is never 100% accurate, which is why you were not given any guarantees.  Stress testing does not always accurately diagnose coronary artery disease.  Sometimes stress tests can indicate coronary artery disease even when patients do not actually have coronary artery disease.  Therefore, the test results are only used to change the probability that you have coronary artery disease.  Stress testing can never absolutely rule it out or diagnose it.    One thing we can say for certain about your test results: Your test results makes your symptoms much less likely to be coronary artery disease.  Our stress tests are designed to detect arteries that are severely narrowed (70% or more).  Therefore, it is possible to have other types of buildup in your arteries that are less than 70% and still have a \"low-risk\" stress test result.    To put this in plain language, a low-risk stress test possibly means that you do not have any significant narrowing of the arteries to your heart by greater than 70%.  However, because they are not perfect you still could have a heart attack if you have smaller build up (less than 70%) that ruptures and forms a clot.  However, the chance of this is much lower with a normal stress test.    Here are some statistics about stress test results:    · 97% of patients who perform a treadmill stress test and " have a low-risk result will be alive in 5 years.      · If you are a patient who had a nuclear myocardial perfusion scintigraphy test with a low-risk result, your chance of actually dying from a cardiac death is 0.5 %/year.  Additionally, there is a 98% chance that you do not have significant narrowing of the arteries to your heart.    · If you received a stress echocardiogram and had a low risk result, your chance yearly of having a cardiovascular event is only 0.8%.  In fact, your chance of not having a heart attack in the next 4 years is 97%.  Overall, your test results can provide you up to a 99% chance that you actually do not have any severe narrowing of the arteries to your heart.      At this point, you can be reassured based on these numbers.  However, because a small number of patients may have severely narrowed arteries with a low-risk stress test, it is important that you monitor your symptoms.  If you have any changes in your symptoms, please do not hesitate to contact my office.  We can always arrange additional testing.    Best in health,    Iain Chin MD PhD

## 2021-03-30 ENCOUNTER — PROCEDURE VISIT (OUTPATIENT)
Dept: PULMONOLOGY | Facility: CLINIC | Age: 36
End: 2021-03-30

## 2021-03-30 ENCOUNTER — OFFICE VISIT (OUTPATIENT)
Dept: CARDIOLOGY | Facility: CLINIC | Age: 36
End: 2021-03-30

## 2021-03-30 VITALS
OXYGEN SATURATION: 94 % | DIASTOLIC BLOOD PRESSURE: 78 MMHG | HEART RATE: 82 BPM | SYSTOLIC BLOOD PRESSURE: 112 MMHG | BODY MASS INDEX: 27.53 KG/M2 | WEIGHT: 175.8 LBS

## 2021-03-30 DIAGNOSIS — R06.09 DYSPNEA ON EXERTION: ICD-10-CM

## 2021-03-30 LAB
BH CV ECHO MEAS - AO MAX PG (FULL): 1.2 MMHG
BH CV ECHO MEAS - AO MAX PG: 6.6 MMHG
BH CV ECHO MEAS - AO MEAN PG (FULL): 1 MMHG
BH CV ECHO MEAS - AO MEAN PG: 4 MMHG
BH CV ECHO MEAS - AO ROOT AREA (BSA CORRECTED): 1.6
BH CV ECHO MEAS - AO ROOT AREA: 7.5 CM^2
BH CV ECHO MEAS - AO ROOT DIAM: 3.1 CM
BH CV ECHO MEAS - AO V2 MAX: 128 CM/SEC
BH CV ECHO MEAS - AO V2 MEAN: 90.7 CM/SEC
BH CV ECHO MEAS - AO V2 VTI: 23.8 CM
BH CV ECHO MEAS - ASC AORTA: 2.9 CM
BH CV ECHO MEAS - AVA(I,A): 2.6 CM^2
BH CV ECHO MEAS - AVA(I,D): 2.6 CM^2
BH CV ECHO MEAS - AVA(V,A): 2.6 CM^2
BH CV ECHO MEAS - AVA(V,D): 2.6 CM^2
BH CV ECHO MEAS - BSA(HAYCOCK): 1.9 M^2
BH CV ECHO MEAS - BSA: 1.9 M^2
BH CV ECHO MEAS - BZI_BMI: 26.8 KILOGRAMS/M^2
BH CV ECHO MEAS - BZI_METRIC_HEIGHT: 170.2 CM
BH CV ECHO MEAS - BZI_METRIC_WEIGHT: 77.6 KG
BH CV ECHO MEAS - EDV(CUBED): 91.1 ML
BH CV ECHO MEAS - EDV(MOD-SP2): 66 ML
BH CV ECHO MEAS - EDV(MOD-SP4): 86 ML
BH CV ECHO MEAS - EDV(TEICH): 92.4 ML
BH CV ECHO MEAS - EF(CUBED): 64 %
BH CV ECHO MEAS - EF(MOD-SP2): 56.1 %
BH CV ECHO MEAS - EF(MOD-SP4): 68.6 %
BH CV ECHO MEAS - EF(TEICH): 55.7 %
BH CV ECHO MEAS - EPSS: 0.5 CM
BH CV ECHO MEAS - ESV(CUBED): 32.8 ML
BH CV ECHO MEAS - ESV(MOD-SP2): 29 ML
BH CV ECHO MEAS - ESV(MOD-SP4): 27 ML
BH CV ECHO MEAS - ESV(TEICH): 41 ML
BH CV ECHO MEAS - FS: 28.9 %
BH CV ECHO MEAS - IVS/LVPW: 1
BH CV ECHO MEAS - IVSD: 1.1 CM
BH CV ECHO MEAS - LA DIMENSION: 3 CM
BH CV ECHO MEAS - LA/AO: 0.97
BH CV ECHO MEAS - LV DIASTOLIC VOL/BSA (35-75): 45.5 ML/M^2
BH CV ECHO MEAS - LV MASS(C)D: 175 GRAMS
BH CV ECHO MEAS - LV MASS(C)DI: 92.5 GRAMS/M^2
BH CV ECHO MEAS - LV MAX PG: 5.4 MMHG
BH CV ECHO MEAS - LV MEAN PG: 3 MMHG
BH CV ECHO MEAS - LV SYSTOLIC VOL/BSA (12-30): 14.3 ML/M^2
BH CV ECHO MEAS - LV V1 MAX: 116 CM/SEC
BH CV ECHO MEAS - LV V1 MEAN: 73.8 CM/SEC
BH CV ECHO MEAS - LV V1 VTI: 22.1 CM
BH CV ECHO MEAS - LVIDD: 4.5 CM
BH CV ECHO MEAS - LVIDS: 3.2 CM
BH CV ECHO MEAS - LVLD AP2: 8.1 CM
BH CV ECHO MEAS - LVLD AP4: 8.2 CM
BH CV ECHO MEAS - LVLS AP2: 7.1 CM
BH CV ECHO MEAS - LVLS AP4: 7.1 CM
BH CV ECHO MEAS - LVOT AREA (M): 2.8 CM^2
BH CV ECHO MEAS - LVOT AREA: 2.8 CM^2
BH CV ECHO MEAS - LVOT DIAM: 1.9 CM
BH CV ECHO MEAS - LVPWD: 1.1 CM
BH CV ECHO MEAS - MR MAX PG: 81.4 MMHG
BH CV ECHO MEAS - MR MAX VEL: 451 CM/SEC
BH CV ECHO MEAS - MV A MAX VEL: 45.9 CM/SEC
BH CV ECHO MEAS - MV DEC SLOPE: 312 CM/SEC^2
BH CV ECHO MEAS - MV E MAX VEL: 73.5 CM/SEC
BH CV ECHO MEAS - MV E/A: 1.6
BH CV ECHO MEAS - MV MAX PG: 3.2 MMHG
BH CV ECHO MEAS - MV MEAN PG: 1 MMHG
BH CV ECHO MEAS - MV P1/2T MAX VEL: 83.4 CM/SEC
BH CV ECHO MEAS - MV P1/2T: 78.3 MSEC
BH CV ECHO MEAS - MV V2 MAX: 88.9 CM/SEC
BH CV ECHO MEAS - MV V2 MEAN: 56.9 CM/SEC
BH CV ECHO MEAS - MV V2 VTI: 24.8 CM
BH CV ECHO MEAS - MVA P1/2T LCG: 2.6 CM^2
BH CV ECHO MEAS - MVA(P1/2T): 2.8 CM^2
BH CV ECHO MEAS - MVA(VTI): 2.5 CM^2
BH CV ECHO MEAS - PA MAX PG (FULL): 2 MMHG
BH CV ECHO MEAS - PA MAX PG: 3.8 MMHG
BH CV ECHO MEAS - PA MEAN PG (FULL): 2 MMHG
BH CV ECHO MEAS - PA MEAN PG: 3 MMHG
BH CV ECHO MEAS - PA V2 MAX: 97.9 CM/SEC
BH CV ECHO MEAS - PA V2 MEAN: 74.1 CM/SEC
BH CV ECHO MEAS - PA V2 VTI: 22.4 CM
BH CV ECHO MEAS - PI END-D VEL: 95 CM/SEC
BH CV ECHO MEAS - RV MAX PG: 1.8 MMHG
BH CV ECHO MEAS - RV MEAN PG: 1 MMHG
BH CV ECHO MEAS - RV V1 MAX: 67.7 CM/SEC
BH CV ECHO MEAS - RV V1 MEAN: 45.1 CM/SEC
BH CV ECHO MEAS - RV V1 VTI: 15.4 CM
BH CV ECHO MEAS - RVDD: 2.5 CM
BH CV ECHO MEAS - SI(AO): 95 ML/M^2
BH CV ECHO MEAS - SI(CUBED): 30.8 ML/M^2
BH CV ECHO MEAS - SI(LVOT): 33.1 ML/M^2
BH CV ECHO MEAS - SI(MOD-SP2): 19.6 ML/M^2
BH CV ECHO MEAS - SI(MOD-SP4): 31.2 ML/M^2
BH CV ECHO MEAS - SI(TEICH): 27.2 ML/M^2
BH CV ECHO MEAS - SV(AO): 179.6 ML
BH CV ECHO MEAS - SV(CUBED): 58.4 ML
BH CV ECHO MEAS - SV(LVOT): 62.7 ML
BH CV ECHO MEAS - SV(MOD-SP2): 37 ML
BH CV ECHO MEAS - SV(MOD-SP4): 59 ML
BH CV ECHO MEAS - SV(TEICH): 51.5 ML
BH CV ECHO MEAS - TR MAX VEL: 185 CM/SEC
BH CV VAS BP LEFT ARM: NORMAL MMHG

## 2021-03-30 PROCEDURE — 94729 DIFFUSING CAPACITY: CPT | Performed by: INTERNAL MEDICINE

## 2021-03-30 PROCEDURE — 94010 BREATHING CAPACITY TEST: CPT | Performed by: INTERNAL MEDICINE

## 2021-03-30 PROCEDURE — 94618 PULMONARY STRESS TESTING: CPT | Performed by: INTERNAL MEDICINE

## 2021-03-30 PROCEDURE — 94727 GAS DIL/WSHOT DETER LNG VOL: CPT | Performed by: INTERNAL MEDICINE

## 2021-03-30 NOTE — PROGRESS NOTES
Full PFT performed. No post spirometry per order.     Good patient effort and cooperation.     Ordered by Dr. Chin, read by Dr. Tereso Chapman

## 2021-03-30 NOTE — PROGRESS NOTES
Mckinley Woods  Procedure: 6 Minute Walk Test   03/30/2021  Indication:CHF    Pretest: BP:118/74               HR:71               Sa02:98               Dyspnea:5               Fatigue:7    Post Test: BP:132/80               HR:51                  Sa02:99               Dyspnea:5               Fatigue:7    First 6MWT:yes    Supplemental oxygen during test:no    Stopped before 6 minutes:no    Pauses:0    Results in distance walked:356.24 meters    Did individual experience any pain or discomfort:no    I was present for the above test and agree with the data.     NYHA Functional Class I  Iain Chin MD PhD

## 2021-04-22 ENCOUNTER — OFFICE VISIT (OUTPATIENT)
Dept: CARDIOLOGY | Facility: CLINIC | Age: 36
End: 2021-04-22

## 2021-04-22 VITALS
SYSTOLIC BLOOD PRESSURE: 117 MMHG | BODY MASS INDEX: 28.25 KG/M2 | DIASTOLIC BLOOD PRESSURE: 70 MMHG | OXYGEN SATURATION: 99 % | HEART RATE: 74 BPM | WEIGHT: 180 LBS | HEIGHT: 67 IN

## 2021-04-22 DIAGNOSIS — I47.1 PAROXYSMAL SVT (SUPRAVENTRICULAR TACHYCARDIA) (HCC): ICD-10-CM

## 2021-04-22 DIAGNOSIS — R06.09 DYSPNEA ON EXERTION: ICD-10-CM

## 2021-04-22 DIAGNOSIS — R07.89 CHEST PAIN, ATYPICAL: Primary | ICD-10-CM

## 2021-04-22 DIAGNOSIS — R94.2 ABNORMAL PFT: ICD-10-CM

## 2021-04-22 PROCEDURE — 99214 OFFICE O/P EST MOD 30 MIN: CPT | Performed by: INTERNAL MEDICINE

## 2021-04-22 RX ORDER — KETOCONAZOLE 20 MG/ML
SHAMPOO TOPICAL
COMMUNITY

## 2021-04-22 NOTE — PROGRESS NOTES
Pulmonary Arterial Hypertension & Heart Failure      Iain Chin M.D., Ph.D., [facility]               History of Present Illness  This is a 35 y.o. male with:    1. CPS with low-rise  2. Palpitations without EP etiology  3. Asymptomatic SVT  4. Dyspnea  5. Risks: HTN, Dyslipidemia, Smoker, Overweight      Review of Systems - Review of Systems   Cardiovascular: Positive for dyspnea on exertion. Negative for chest pain, claudication, cyanosis, irregular heartbeat, leg swelling, near-syncope, orthopnea, palpitations, paroxysmal nocturnal dyspnea and syncope.   Respiratory: Positive for shortness of breath and snoring. Negative for cough and hemoptysis.    All other systems reviewed and are negative.       All other systems were reviewed and were negative.    family history includes Asthma in his mother; COPD in his mother; Heart disease in his mother; Hyperlipidemia in his mother; Hypertension in his mother; Rheum arthritis in his mother; Sleep apnea in his sister.     reports that he has been smoking cigarettes. He has smoked for the past 5.00 years. He has never used smokeless tobacco. He reports current alcohol use. He reports current drug use. Drug: Marijuana.    Allergies   Allergen Reactions   • Other Swelling     Cherries / throat swells   • Flexeril [Cyclobenzaprine] Cough     Coughing up blood         Current Outpatient Medications:   •  amLODIPine (NORVASC) 5 MG tablet, Daily., Disp: , Rfl:   •  fluticasone (FLONASE) 50 MCG/ACT nasal spray, Daily., Disp: , Rfl:   •  ketoconazole (NIZORAL) 2 % shampoo, ketoconazole 2 % shampoo  APPLY TO THE AFFECTED AREA(S), LATHER, LEAVE IN PLACE FOR 5 MINUTES, AND THEN RINSE OFF WITH WATER BY TOPICAL ROUTE ONCE DAILY, Disp: , Rfl:   •  omeprazole (priLOSEC) 40 MG capsule, Take 40 mg by mouth Daily., Disp: , Rfl:       Physical Exam:  Vitals:    04/22/21 1003   BP: 117/70   BP Location: Right arm   Patient Position: Sitting   Cuff Size: Adult   Pulse: 74   SpO2: 99%    "  Weight: 81.6 kg (180 lb)   Height: 170.2 cm (67\")   PainSc: 0-No pain     Pulse Pressure: normal  Pulse Ox: Normal  on room air  General: alert, appears stated age, cooperative and Ambulates with a cane     Body Habitus: overweight    HEENT: Head: Normocephalic, no lesions, without obvious abnormality. No arcus senilis, xanthelasma or xanthomas.    Neuro: alert, oriented x3  speech normal in context and clarity  memory intact grossly  Pulses: 2+ and symmetric  JVP: Volume/Pulsation: Normal.  Normal waveforms.   Appropriate inspiratory decrease.  No Kussmaul's. No Shane's.   Respirations: no increased work of breathing     Pulmonary:Normal     Precordium: Normal impulses. P2 is not palpable.  RV Heave: absent  LV Heave: absent  [place]:  normal size and placement  Palpable S4: absent.  Heart rate: normal    Heart Rhythm: regular     Heart Sounds: S1: normal intensity  S2: normal intensity  S3: absent   S4: absent  Opening Snap: absent    A2-OS:  no  Pericardial Rub:  Absent   Ejection click: None      Murmurs:  absent   Extremity: moves all extremities equally.     DATA REVIEWED:   Results for orders placed in visit on 03/02/20    Adult Stress Echo W/ Cont or Stress Agent if Necessary Per Protocol    Interpretation Summary  · Resting left ventricular systolic function is normal at 56-60%. Concentric hypertrophy is present.  · Trace pericardial effusion is noted.  · Target heart rate achieved with dobutamine and atropine. Stress ECG without evidence of ischemia.  · Stress LVEF is hyperdynamic at greater than 70%.  · Normal stress echo with no significant echocardiographic evidence for myocardial ischemia.    Stress echo shows no evidence of inducible ischemia.    I personally interpreted the 2D TTE.  He has preserved biventricular function.    I interpreted a 6-minute walk test.  Stable.    PFTs were mildly " restrictive.  --------------------------------------------------------------------------------------------------    Assessment/Plan        1. Chest pain, atypical    2. Dyspnea on exertion    3. Paroxysmal SVT (supraventricular tachycardia) (CMS/HCC)    4. Abnormal PFT      1.  Stress echo was low-risk.  Low-risk stress testing handout was provided.  Negative predictive value was explained to the patient in a manner he can understand.  At this point, he requires no further cardiovascular follow-up.    2.  I suspect this is multifactorial.   PFTs were mildly restrictive.  This is likely related to his body habitus, but ILD would need to be excluded.  I recommended a pulmonary referral.    3.  This was asymptomatic on a monitor.  Palpitations did not correlate with arrhythmia.  He does not meet criteria for suppressive therapy.  His burden of SVT was not very much.   Additionally, they were short-lived.  I have asked him to contact the office for any worsening symptoms.    4.  Pulmonary referral      Mckinley Woods is discharged from my care. The patient has no active CV issues that require ongoing CV sub-specialty care. They have been encouraged to follow-up with their PCP.

## 2021-04-22 NOTE — PATIENT INSTRUCTIONS
"LOW-RISK STRESS TEST  RESULTS  Iain Chin M.D., Ph.D., [facility]      Mckinley Birdlisa Woods:    Because of the symptoms you are having you were sent for a stress test.  As we discussed today in your office appointment, you have received what is known as a \"low-risk\" stress test result.  Often times, people want to refer to this as a \"normal\" stress test.  However, stress test really are not normal.  They can only classify your risk of having coronary artery disease.  You have received the best case scenario.  This document is my attempt to explain those results to you further.    Stress testing is never 100% accurate, which is why you were not given any guarantees.  Stress testing does not always accurately diagnose coronary artery disease.  Sometimes stress tests can indicate coronary artery disease even when patients do not actually have coronary artery disease.  Therefore, the test results are only used to change the probability that you have coronary artery disease.  Stress testing can never absolutely rule it out or diagnose it.    One thing we can say for certain about your test results: Your test results makes your symptoms much less likely to be coronary artery disease.  Our stress tests are designed to detect arteries that are severely narrowed (70% or more).  Therefore, it is possible to have other types of buildup in your arteries that are less than 70% and still have a \"low-risk\" stress test result.    To put this in plain language, a low-risk stress test possibly means that you do not have any significant narrowing of the arteries to your heart by greater than 70%.  However, because they are not perfect you still could have a heart attack if you have smaller build up (less than 70%) that ruptures and forms a clot.  However, the chance of this is much lower with a normal stress test.    Here are some statistics about stress test results:    · 97% of patients who perform a treadmill stress test and " have a low-risk result will be alive in 5 years.      · If you are a patient who had a nuclear myocardial perfusion scintigraphy test with a low-risk result, your chance of actually dying from a cardiac death is 0.5 %/year.  Additionally, there is a 98% chance that you do not have significant narrowing of the arteries to your heart.    · If you received a stress echocardiogram and had a low risk result, your chance yearly of having a cardiovascular event is only 0.8%.  In fact, your chance of not having a heart attack in the next 4 years is 97%.  Overall, your test results can provide you up to a 99% chance that you actually do not have any severe narrowing of the arteries to your heart.      At this point, you can be reassured based on these numbers.  However, because a small number of patients may have severely narrowed arteries with a low-risk stress test, it is important that you monitor your symptoms.  If you have any changes in your symptoms, please do not hesitate to contact my office.  We can always arrange additional testing.    Best in health,              Preventing Unhealthy Weight Gain, Adult  Staying at a healthy weight is important to your overall health. When fat builds up in your body, you may become overweight or obese. Being overweight or obese increases your risk of developing certain health problems, such as heart disease, diabetes, sleeping problems, joint problems, and some types of cancer.  Unhealthy weight gain is often the result of making unhealthy food choices or not getting enough exercise. You can make changes to your lifestyle to prevent obesity and stay as healthy as possible.  What nutrition changes can be made?    · Eat only as much as your body needs. To do this:  ? Pay attention to signs that you are hungry or full. Stop eating as soon as you feel full.  ? If you feel hungry, try drinking water first before eating. Drink enough water so your urine is clear or pale yellow.  ? Eat  smaller portions. Pay attention to portion sizes when eating out.  ? Look at serving sizes on food labels. Most foods contain more than one serving per container.  ? Eat the recommended number of calories for your gender and activity level. For most active people, a daily total of 2,000 calories is appropriate. If you are trying to lose weight or are not very active, you may need to eat fewer calories. Talk with your health care provider or a diet and nutrition specialist (dietitian) about how many calories you need each day.  · Choose healthy foods, such as:  ? Fruits and vegetables. At each meal, try to fill at least half of your plate with fruits and vegetables.  ? Whole grains, such as whole-wheat bread, brown rice, and quinoa.  ? Lean meats, such as chicken or fish.  ? Other healthy proteins, such as beans, eggs, or tofu.  ? Healthy fats, such as nuts, seeds, fatty fish, and olive oil.  ? Low-fat or fat-free dairy products.  · Check food labels, and avoid food and drinks that:  ? Are high in calories.  ? Have added sugar.  ? Are high in sodium.  ? Have saturated fats or trans fats.  · Cook foods in healthier ways, such as by baking, broiling, or grilling.  · Make a meal plan for the week, and shop with a grocery list to help you stay on track with your purchases. Try to avoid going to the grocery store when you are hungry.  · When grocery shopping, try to shop around the outside of the store first, where the fresh foods are. Doing this helps you to avoid prepackaged foods, which can be high in sugar, salt (sodium), and fat.  What lifestyle changes can be made?    · Exercise for 30 or more minutes on 5 or more days each week. Exercising may include brisk walking, yard work, biking, running, swimming, and team sports like basketball and soccer. Ask your health care provider which exercises are safe for you.  · Do muscle-strengthening activities, such as lifting weights or using resistance bands, on 2 or more days  a week.  · Do not use any products that contain nicotine or tobacco, such as cigarettes and e-cigarettes. If you need help quitting, ask your health care provider.  · Limit alcohol intake to no more than 1 drink a day for nonpregnant women and 2 drinks a day for men. One drink equals 12 oz of beer, 5 oz of wine, or 1½ oz of hard liquor.  · Try to get 7-9 hours of sleep each night.  What other changes can be made?  · Keep a food and activity journal to keep track of:  ? What you ate and how many calories you had. Remember to count the calories in sauces, dressings, and side dishes.  ? Whether you were active, and what exercises you did.  ? Your calorie, weight, and activity goals.  · Check your weight regularly. Track any changes. If you notice you have gained weight, make changes to your diet or activity routine.  · Avoid taking weight-loss medicines or supplements. Talk to your health care provider before starting any new medicine or supplement.  · Talk to your health care provider before trying any new diet or exercise plan.  Why are these changes important?  Eating healthy, staying active, and having healthy habits can help you to prevent obesity. Those changes also:  · Help you manage stress and emotions.  · Help you connect with friends and family.  · Improve your self-esteem.  · Improve your sleep.  · Prevent long-term health problems.  What can happen if changes are not made?  Being obese or overweight can cause you to develop joint or bone problems, which can make it hard for you to stay active or do activities you enjoy. Being obese or overweight also puts stress on your heart and lungs and can lead to health problems like diabetes, heart disease, and some cancers.  Where to find more information  Talk with your health care provider or a dietitian about healthy eating and healthy lifestyle choices. You may also find information from:  · U.[place] Department of Agriculture, MyPlate:  www.choosemyplate.gov  · American Heart Association: www.heart.org  · Centers for Disease Control and Prevention: www.cdc.gov  Summary  · Staying at a healthy weight is important to your overall health. It helps you to prevent certain diseases and health problems, such as heart disease, diabetes, joint problems, sleep disorders, and some types of cancer.  · Being obese or overweight can cause you to develop joint or bone problems, which can make it hard for you to stay active or do activities you enjoy.  · You can prevent unhealthy weight gain by eating a healthy diet, exercising regularly, not smoking, limiting alcohol, and getting enough sleep.  · Talk with your health care provider or a dietitian for guidance about healthy eating and healthy lifestyle choices.  This information is not intended to replace advice given to you by your health care provider. Make sure you discuss any questions you have with your health care provider.  Document Revised: 12/21/2018 Document Reviewed: 01/24/2018  [employer] Patient Education © 2021 [employer]

## 2021-06-25 ENCOUNTER — OFFICE VISIT (OUTPATIENT)
Dept: PULMONOLOGY | Facility: CLINIC | Age: 36
End: 2021-06-25

## 2021-06-25 VITALS
HEART RATE: 70 BPM | WEIGHT: 180.1 LBS | DIASTOLIC BLOOD PRESSURE: 68 MMHG | OXYGEN SATURATION: 98 % | BODY MASS INDEX: 28.27 KG/M2 | HEIGHT: 67 IN | RESPIRATION RATE: 22 BRPM | SYSTOLIC BLOOD PRESSURE: 124 MMHG

## 2021-06-25 DIAGNOSIS — R06.09 DYSPNEA ON EXERTION: ICD-10-CM

## 2021-06-25 DIAGNOSIS — F17.200 SMOKER: Primary | ICD-10-CM

## 2021-06-25 PROCEDURE — 99203 OFFICE O/P NEW LOW 30 MIN: CPT | Performed by: INTERNAL MEDICINE
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
